# Patient Record
Sex: MALE | Race: BLACK OR AFRICAN AMERICAN | NOT HISPANIC OR LATINO | Employment: UNEMPLOYED
[De-identification: names, ages, dates, MRNs, and addresses within clinical notes are randomized per-mention and may not be internally consistent; named-entity substitution may affect disease eponyms.]

---

## 2019-10-31 ENCOUNTER — RECORDS - HEALTHEAST (OUTPATIENT)
Dept: ADMINISTRATIVE | Facility: OTHER | Age: 39
End: 2019-10-31

## 2023-01-06 ENCOUNTER — TELEPHONE (OUTPATIENT)
Dept: BEHAVIORAL HEALTH | Facility: CLINIC | Age: 43
End: 2023-01-06

## 2023-01-09 ASSESSMENT — PATIENT HEALTH QUESTIONNAIRE - PHQ9
10. IF YOU CHECKED OFF ANY PROBLEMS, HOW DIFFICULT HAVE THESE PROBLEMS MADE IT FOR YOU TO DO YOUR WORK, TAKE CARE OF THINGS AT HOME, OR GET ALONG WITH OTHER PEOPLE: NOT DIFFICULT AT ALL
SUM OF ALL RESPONSES TO PHQ QUESTIONS 1-9: 0
SUM OF ALL RESPONSES TO PHQ QUESTIONS 1-9: 0

## 2023-01-09 ASSESSMENT — ANXIETY QUESTIONNAIRES
2. NOT BEING ABLE TO STOP OR CONTROL WORRYING: NOT AT ALL
7. FEELING AFRAID AS IF SOMETHING AWFUL MIGHT HAPPEN: NOT AT ALL
6. BECOMING EASILY ANNOYED OR IRRITABLE: NOT AT ALL
IF YOU CHECKED OFF ANY PROBLEMS ON THIS QUESTIONNAIRE, HOW DIFFICULT HAVE THESE PROBLEMS MADE IT FOR YOU TO DO YOUR WORK, TAKE CARE OF THINGS AT HOME, OR GET ALONG WITH OTHER PEOPLE: NOT DIFFICULT AT ALL
7. FEELING AFRAID AS IF SOMETHING AWFUL MIGHT HAPPEN: NOT AT ALL
GAD7 TOTAL SCORE: 0
3. WORRYING TOO MUCH ABOUT DIFFERENT THINGS: NOT AT ALL
4. TROUBLE RELAXING: NOT AT ALL
GAD7 TOTAL SCORE: 0
8. IF YOU CHECKED OFF ANY PROBLEMS, HOW DIFFICULT HAVE THESE MADE IT FOR YOU TO DO YOUR WORK, TAKE CARE OF THINGS AT HOME, OR GET ALONG WITH OTHER PEOPLE?: NOT DIFFICULT AT ALL
1. FEELING NERVOUS, ANXIOUS, OR ON EDGE: NOT AT ALL
5. BEING SO RESTLESS THAT IT IS HARD TO SIT STILL: NOT AT ALL
GAD7 TOTAL SCORE: 0

## 2023-01-10 ENCOUNTER — HOSPITAL ENCOUNTER (OUTPATIENT)
Dept: BEHAVIORAL HEALTH | Facility: CLINIC | Age: 43
Discharge: HOME | End: 2023-01-10
Attending: FAMILY MEDICINE | Admitting: FAMILY MEDICINE
Payer: COMMERCIAL

## 2023-01-10 VITALS — WEIGHT: 170 LBS | HEIGHT: 66 IN | BODY MASS INDEX: 27.32 KG/M2

## 2023-01-10 DIAGNOSIS — F10.10 ALCOHOL USE DISORDER, MILD, ABUSE: ICD-10-CM

## 2023-01-10 PROBLEM — I10 HYPERTENSION: Status: ACTIVE | Noted: 2021-12-07

## 2023-01-10 PROCEDURE — H0001 ALCOHOL AND/OR DRUG ASSESS: HCPCS | Mod: GT,95

## 2023-01-10 RX ORDER — CHLORTHALIDONE 25 MG/1
1 TABLET ORAL DAILY
COMMUNITY
Start: 2022-08-05 | End: 2023-08-05

## 2023-01-10 RX ORDER — ATORVASTATIN CALCIUM 40 MG/1
40 TABLET, FILM COATED ORAL DAILY
COMMUNITY
Start: 2022-08-05 | End: 2023-08-05

## 2023-01-10 ASSESSMENT — COLUMBIA-SUICIDE SEVERITY RATING SCALE - C-SSRS
6. HAVE YOU EVER DONE ANYTHING, STARTED TO DO ANYTHING, OR PREPARED TO DO ANYTHING TO END YOUR LIFE?: NO
TOTAL  NUMBER OF INTERRUPTED ATTEMPTS LIFETIME: NO
TOTAL  NUMBER OF ABORTED OR SELF INTERRUPTED ATTEMPTS LIFETIME: NO
1. HAVE YOU WISHED YOU WERE DEAD OR WISHED YOU COULD GO TO SLEEP AND NOT WAKE UP?: NO
ATTEMPT LIFETIME: NO
2. HAVE YOU ACTUALLY HAD ANY THOUGHTS OF KILLING YOURSELF?: NO

## 2023-01-10 ASSESSMENT — PAIN SCALES - GENERAL: PAINLEVEL: NO PAIN (0)

## 2023-01-10 NOTE — TELEPHONE ENCOUNTER
This patient's 1/10/2023 substance use disorder assessment was faxed to [facility] on 1/10/2023 as a referral for Level 1.0 Outpatient Treatment - the OPTIONS program at the [facility] treatment facility.

## 2023-01-10 NOTE — PROGRESS NOTES
[address] [address]  1/10/2023    Nery Harris  [address] [facility] [address]      Nery,    This is to verify that Nery Harris (1980) participated in a substance use disorder assessment via a video visit with ALBERTO Singh/MYRNA at [facility] in [place], [place] on 1/10/2023.    Recommendations:  1.)  It was recommended the patient continue to abstain from alcohol and from all other non-prescribed mood altering chemicals.   2.)  Follow all of the recommendations of his healthcare providers.  3.)  Enter the Level 1.0 Outpatient Treatment - OPTIONS program at the [facility] treatment facility is a 6 session program that helps individuals assess their personal consequences of substance use and to determine what behaviors must be changed to avoid future consequences. Groups and individual sessions meet over a two week period in the evenings. The clients will leave with a continuum of care plan to help them avoid future consequences.  The OPTIONS program is offered on a monthly basis via telehealth format ([employer]).  The [facility] contact number is: [phone].   4.)  Follow all of the recommendations of his substance use disorder treatment providers.  5.)  Attend 12-step or other recovery support group meetings on a weekly basis.     The patient reported he was willing to follow the above recommendations.      If you have any additional questions or concerns, please feel free to contact me by any of the contact methods listed below.    Sincerely,    ALBERTO Zuniga, MYRNA  CD Evaluation Counselor  [address] [address]  [email or web address]  Tel: [phone]  Fax: [phone]

## 2023-01-10 NOTE — PROGRESS NOTES
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 since his DWI charge on 11/10/2022 and she really hoped he had learned his lesson and would never drink alcohol again.    This counselor talked with this patient's friend, Eriberto Duarte, on 1/10/2023 at 2:05 pm.  Eriberto stated he had known the patient for the past 20 years and Eriberto stated he had never had any concerns about the patient having a substance abuse problem.  Eriberto stated the patient drinks alcohol very infrequently and usually only a few times per year at special events.  Eriberto stated to his best awareness the patient had not consumed any alcohol since his DWI charge on 11/10/2022.    No additional collateral data had been obtained at the time of this documentation.     If court related records were reviewed, summarize here: None    Information from collateral contacts supported/largely agreed with information from the client and associated risk ratings.    Information in this assessment was obtained from the medical record and provided by the patient who is a good historian.        The patient will have open access to his substance use disorder assessment medical record.    As evidenced by self report and criteria, the patient meets the following DSM-5 Diagnoses:  (Sustained by DSM-5 Criteria Listed Above)      Alcohol Use Disorder Mild - 305.00 (F10.10)     Specify if: In early remission:  After full criteria for alcohol/drug use disorder were previously met, none of the criteria for alcohol/drug use disorder have been met for at least 3 months but for less than 12 months (with the exception that Criterion A4,  Craving or a strong desire or urge to use alcohol/drug  may be met).     In sustained remission:   After full criteria for alcohol use disorder were previously met, none of the criteria for alcohol/drug use disorder have been met at any time during a period of 12 months or longer (with the exception that Criterion A4,  Craving or strong desire or urge to use alcohol/drug  may be met).     Specify if:   This additional specifier is used if the individual is in an environment where access to alcohol is restricted.    Mild: Presence of 2-3 symptoms  Moderate: Presence of 4-5 symptoms  Severe: Presence of 6 or more symptoms    Recommendations:     1. Plan for Safety and Risk Management:     It was recommended the patient call 911 or go to the local Emergency Department should there be any significant change in the above risk factors.            Report to child / adult protection services was NA.    2. NIC Referrals:      Recommendations:      1.)  It was recommended the patient continue to abstain from alcohol and from all other non-prescribed mood altering chemicals.   2.)  Follow all of the recommendations of his healthcare providers.  3.)  Enter the Level 1.0 Outpatient Treatment - OPTIONS program at the [facility] treatment facility is a 6 session program that helps individuals assess their personal consequences of substance use and to determine what behaviors must be changed to avoid future consequences. Groups and individual sessions meet over a two week period in the evenings. The clients will leave with a continuum of care plan to help them avoid future  consequences.  The OPTIONS program is offered on a monthly basis via telehealth format (ZOOM).  The [facility] treatment facility contact number is: [phone].   4.)  Follow all of the recommendations of his substance use disorder treatment providers.  5.)  Attend 12-step or other recovery support group meetings on a weekly basis.    The patient reported he was willing to follow the above recommendations.      The patient would like the following family or other support people involved in their treatment:  None at this time.  The patient does not have any history of opioid abuse.      3.  Mental Health Referrals:     The patient did not appear to be in any need of a mental health referral at this time.    4. Cultural Concerns:    The patient did not identify having any cultural concerns regarding mental health, physical health, or substance use issues.     5. Recommendations for treatment focus:      Alcohol / Substance Use - See #2. NIC Referrals above for details on recommendations.    Clinical Substantiation for the above recommendations: The patient would benefit from increasing his awareness regarding the risks of substance abuse and from working on skills to minimize the potential for having future negative substance use consequences by entering and completing the Level 1.0 Outpatient Treatment - OPTIONS program at [facility].    Provider Name/ Credentials:  MYRNA Braun  January 10, 2023

## 2023-01-10 NOTE — TELEPHONE ENCOUNTER
Nery Harris Release of Information requests were e-mailed to the [place] [id number] [facility]'s at [email or web address] on 1/10/2023 with requests for the following releases:    Patient's e-mail address: [email or web address]    1.) NIC - [id number] - Collateral Contact & Emergency Contact   Bridgette Harris (mother)  Tel #: [phone]    2.) NIC - 519453 - Collateral Contact & Emergency Contact  Eriberto Gerald (friend)  Tel #: [phone]    3.) NIC - [id number] - Exchange of MH & NIC Records  [facility] [place] (attn: [place])  Tel #: [phone]  Fax #: [phone]    4.) NIC - [id number] - Exchange of MH & NIC Records  [employer]  Tel #: [phone]  Fax #: [phone]

## 2023-01-10 NOTE — TELEPHONE ENCOUNTER
This counselor talked with this patient's mother, Bridgette Harris, on 1/10/2023 at 2:00 pm.  Bridgette stated the patient had never been a heavy drinker of alcohol despite having a history of 3 DWI charges.  Bridgette stated to her best awareness the patient had not consumed any alcohol since his DWI charge on 11/10/2022 and she really hoped he had learned his lesson and would never drink alcohol again.

## 2023-01-10 NOTE — TELEPHONE ENCOUNTER
This patient's NIC Assessment DAANES information was entered directly into the [place] [facility] DAANES website on 1/10/2023.  Assessment ID: [id number]

## 2023-01-17 NOTE — TELEPHONE ENCOUNTER
This patient's 1/10/2023 substance use disorder assessment was re-faxed to the [place] [facility] at [phone] on 1/17/2023.

## 2023-01-17 NOTE — TELEPHONE ENCOUNTER
This counselor talked with this patient on 1/17/2023 at 11:20 am.  The patient requested his 1/10/2023 substance use disorder assessment be re-faxed to the [place] [facility] at [phone].

## 2023-02-04 ENCOUNTER — HEALTH MAINTENANCE LETTER (OUTPATIENT)
Age: 43
End: 2023-02-04

## 2024-03-02 ENCOUNTER — HEALTH MAINTENANCE LETTER (OUTPATIENT)
Age: 44
End: 2024-03-02

## 2025-03-15 ENCOUNTER — HEALTH MAINTENANCE LETTER (OUTPATIENT)
Age: 45
End: 2025-03-15